# Patient Record
Sex: FEMALE | Race: WHITE | NOT HISPANIC OR LATINO | Employment: FULL TIME | URBAN - METROPOLITAN AREA
[De-identification: names, ages, dates, MRNs, and addresses within clinical notes are randomized per-mention and may not be internally consistent; named-entity substitution may affect disease eponyms.]

---

## 2023-06-23 LAB
ABO GROUP (TYPE) IN BLOOD: NORMAL
ANTIBODY SCREEN: NORMAL
CHOLESTEROL (MG/DL) IN SER/PLAS: 170 MG/DL (ref 0–199)
CHOLESTEROL IN HDL (MG/DL) IN SER/PLAS: 59.5 MG/DL
CHOLESTEROL/HDL RATIO: 2.9
HEPATITIS B VIRUS SURFACE AG PRESENCE IN SERUM: NONREACTIVE
HEPATITIS C VIRUS AB PRESENCE IN SERUM: NONREACTIVE
HIV 1/ 2 AG/AB SCREEN: NONREACTIVE
LDL: 97 MG/DL (ref 0–99)
RH FACTOR: NORMAL
RUBELLA VIRUS IGG AB: POSITIVE
SYPHILIS TOTAL AB: NONREACTIVE
THYROTROPIN (MIU/L) IN SER/PLAS BY DETECTION LIMIT <= 0.05 MIU/L: 2.18 MIU/L (ref 0.44–3.98)
TRIGLYCERIDE (MG/DL) IN SER/PLAS: 70 MG/DL (ref 0–149)
VARICELLA ZOSTER IGG: POSITIVE
VLDL: 14 MG/DL (ref 0–40)

## 2023-06-24 LAB
CHLAMYDIA TRACH., AMPLIFIED: NEGATIVE
N. GONORRHEA, AMPLIFIED: NEGATIVE

## 2023-06-27 LAB — ANTI-MULLERIAN HORMONE (AMH): 1.55 NG/ML (ref 0.18–11.71)

## 2023-09-25 LAB
ESTIMATED AVERAGE GLUCOSE FOR HBA1C: 108 MG/DL
ESTRADIOL (PG/ML) IN SER/PLAS: 258 PG/ML
HEMOGLOBIN A1C/HEMOGLOBIN TOTAL IN BLOOD: 5.4 %
LUTEINIZING HORMONE (IU/ML) IN SER/PLAS: 18.4 IU/L

## 2023-10-11 ENCOUNTER — TELEPHONE (OUTPATIENT)
Dept: ENDOCRINOLOGY | Facility: CLINIC | Age: 38
End: 2023-10-11
Payer: COMMERCIAL

## 2023-10-11 NOTE — TELEPHONE ENCOUNTER
Patient got a negative pregnancy test, wants to discuss next steps      RN returned patient's phone call. Patient had a negative pregnancy test post IUI. Patient also reported she has abdominal bloating and pain in her right ovary. RN informed patient this can be a normal occurrence post trigger and should resolve once her menses begins. If not, and she is still experiencing pain she can call the office back. RN instructed patient to stop her prometrium and to call the office with her menses to start her next Letrozole 5mg, monitoring, trigger, IUI cycle. Patient verbalized understanding.   Liseth García RN 10/11/23 12:52 PM

## 2023-10-12 DIAGNOSIS — N97.9 FEMALE INFERTILITY: ICD-10-CM

## 2023-10-13 ENCOUNTER — TELEPHONE (OUTPATIENT)
Dept: ENDOCRINOLOGY | Facility: CLINIC | Age: 38
End: 2023-10-13
Payer: COMMERCIAL

## 2023-10-13 DIAGNOSIS — N97.9 FEMALE INFERTILITY: ICD-10-CM

## 2023-10-13 RX ORDER — LETROZOLE 2.5 MG/1
5 TABLET, FILM COATED ORAL DAILY
Qty: 10 TABLET | Refills: 0 | Status: SHIPPED | OUTPATIENT
Start: 2023-10-13 | End: 2023-10-18

## 2023-10-13 NOTE — TELEPHONE ENCOUNTER
Returned patient call regarding CD1 today 10/13/23. Per conversation with Dr. Musa and Katie Castaneda, RN, patient is to come in to baseline this cycle d/t Right ovary pain after trigger shot last cycle. Patient is concerned she may have a cyst and is unsure if she should proceed with Letrozole/trigger shot this cycle. Dr. Musa recommends that patient come in for baseline this cycle to see what is happening and go from there. Patient states she will be OOT 10/20-10/25 and is unsure if she would like to proceed this cycle. Patient states she would like to discuss with partner and go from there. Patient instructed to call office and schedule a baseline for CD4 10/16/23 if she would like to proceed and we can go from there after we review.   ALYSSA HANNON on 10/13/23 at 9:39 AM.   Patient called office to schedule baseline for 10/16/23 for follicle scan, E2 and LH for possible Letrozole 5 mg/monitoring/trigger/IUI cycle. Patient instructed she can take Letrozole CD4-8 after a negative home pregnancy test. Patient instructed Letrozole prescription sent to local pharmacy of choice.   ALYSSA HANNON on 10/13/23 at 9:41 AM.

## 2023-10-16 ENCOUNTER — APPOINTMENT (OUTPATIENT)
Dept: ENDOCRINOLOGY | Facility: CLINIC | Age: 38
End: 2023-10-16
Payer: COMMERCIAL

## 2023-10-23 ENCOUNTER — TELEPHONE (OUTPATIENT)
Dept: ENDOCRINOLOGY | Facility: CLINIC | Age: 38
End: 2023-10-23
Payer: COMMERCIAL

## 2023-10-23 NOTE — TELEPHONE ENCOUNTER
Eboni needs clarification on the start date for the patient. Received a request with a start date of 9/26 and of 10/13

## 2023-10-23 NOTE — TELEPHONE ENCOUNTER
This RN sent email to Adrianna Hutchison regarding Progeny questions.   ALYSSA HANNON on 10/23/23 at 3:23 PM.

## 2024-01-08 ENCOUNTER — TELEPHONE (OUTPATIENT)
Dept: NEUROLOGY | Facility: CLINIC | Age: 39
End: 2024-01-08
Payer: COMMERCIAL

## 2024-01-08 DIAGNOSIS — G43.909 MIGRAINE WITHOUT STATUS MIGRAINOSUS, NOT INTRACTABLE, UNSPECIFIED MIGRAINE TYPE: ICD-10-CM

## 2024-01-08 RX ORDER — METHYLPREDNISOLONE 4 MG/1
TABLET ORAL
Qty: 21 TABLET | Refills: 0 | Status: SHIPPED | OUTPATIENT
Start: 2024-01-08 | End: 2024-01-15

## 2024-01-08 NOTE — TELEPHONE ENCOUNTER
Spoke with Sujata. Is on day 3 of Medrol dose staci which worked at 2 days of higher doses but severe migraine has returned.  Restart medrol course with 6 tabs today from previous staci and sending another for her to continue 6 days taper. Has appointment scheduled for February for follow up.

## 2024-02-08 ENCOUNTER — TELEPHONE (OUTPATIENT)
Dept: ENDOCRINOLOGY | Facility: CLINIC | Age: 39
End: 2024-02-08
Payer: COMMERCIAL

## 2024-02-08 NOTE — PROGRESS NOTES
Boarding Pass Oral/Injectible with TIC/IUI Checklist    Age: 38 y.o.    Provider: MD Radha Mariscal, CNP  Primary RN: Ledy   Reasons for Treatment:  hx of dysmenorrhea and cysts  Last BMI  23 : 20.60 kg/m²       Past Medical History:   Diagnosis Date    Female infertility, unspecified 2020    Female infertility, unexplained    Lyme disease, unspecified 2017    Lyme disease, acute    Other ovarian cyst, right side 09/15/2020    Complex cyst of both ovaries    Other specified joint disorders, left hip 03/15/2016    Femoral acetabular impingement, left    Personal history of other diseases of the female genital tract 2020    History of irregular menstrual cycles    Personal history of other diseases of the female genital tract 2015    Personal history of endometriosis    Personal history of other diseases of the female genital tract 2020    History of dysmenorrhea    Personal history of other diseases of the nervous system and sense organs     History of migraine    Personal history of other specified conditions 2017    History of nausea    Personal history of other specified conditions 10/10/2019    History of syncope    Personal history of other specified conditions 10/07/2019    History of syncope    Personal history of traumatic brain injury     History of concussion    Postural orthostatic tachycardia syndrome (POTS)     POTS (postural orthostatic tachycardia syndrome)       Date Done Consultation Results/Comments   2023 Medication Protocol LETROZOLE 5mg CD 3-7 trigger, IUI and prog x 3 cycles    10/12/2023 Procedure Order Placed [x]      MFM Consult Okay to proceed? N/A    Psych Consult Okay to proceed? N/A    Genetics Consult Okay to proceed? N/A    Other    Date Done Female Labs Results/Comments   2023 T&S (Q 1 Year) Results for orders placed or performed in visit on 23 (from the past 8760 hour(s))   Type And Screen   Result Value Ref  Range    ABO GROUP (TYPE) IN BLOOD A     Rh POS     ANTIBODY SCREEN NEG         6/23/2023 Hep B sAg NONREACTIVE   6/23/2023 Hep C AB NONREACTIVE   6/23/2023 HIV NONREACTIVE   6/23/2023 Syphilis NONREACTIVE   6/23/2023 GC/CT GC: NEGATIVE  CT: NEGATIVE   6/23/2023 Rubella (Q 5 Years) POSITIVE   6/23/2023 Varicella (Q 5 Years) POSITIVE   6/23/2023 TSH 2.18 (Ref range: 0.44 - 3.98 mIU/L)   9/25/2023 HgbA1C 5.4 (Ref range: %)   6/23/2023 AMH 1.553    Carrier Screening Myriad 2bP:   Declined  N/A    Uterine Cavity Eval Pelvic US: (06/23/2023) Anteverted uterus with an endometrial lining that measures as below.   The ovaries are normal in  appearance bilaterally.    HSG: (recommended if not pregnant after 3 IUI cycles)       1/13/2020 Pap Smear Lab Results   Component Value Date    CVTFINALDX  01/13/2020     A.  THINPREP CERVICAL:              Specimen adequacy:       SATISFACTORY FOR EVALUATION.       Quality Indicator: Endocervical/transformation zone component is present.       Quality Indicator: Partially obscuring inflammation.              General Categorization:       NEGATIVE FOR INTRAEPITHELIAL LESION OR MALIGNANCY.                            HIGH RISK HPV TEST RESULT:                       HPV GENOTYPE  16                      NEGATIVE       HPV GENOTYPE  18                      NEGATIVE       HPV GENOTYPE  OTHER              NEGATIVE              Reference Range: Negative                      2/9/24 Mammogram WNL   Date Done Male Labs (required if IUI)   Results/Comments  KRISTY DE LOS SANTOS (MRN: [id number])   8/8/2023 Hep B sAg NONREACTIVE   8/8/2023 Hep C AB  NONREACTIVE   8/8/2023 HIV NONREACTIVE   8/8/2023 Syphilis NONREACTIVE   8/8/2023 GC/CT GC: NEGATIVE  CT: NEGATIVE    Carrier Screening   Declined  N/A   7/20/2023 Semen Analysis  Volume(mL): 3  Count(million): 142.86  Motility(%): 52%  Motile Count(million): 223.93   Date Done Miscellaneous Results/Comments    BMI Checklist  BMI > 40 or < 18 Added to chart:        >= 45 Checklist  Added to chart:      Needs mammogram prior to next Letrozole/IUI cycle.   24 at 12:40 PM - BRIDGET Jones-CNP     Boarding pass reviewed and agree with plan Letrozole 5 mg Cd 3-7 with ultrasound monitoring hcg trigger and partner IUI with LP prometrium ok for 2 additional cycles if not pregnant recommend HSG and FUV. Cycles must be done before labs  2024  Reviewed and approved by STEPHANE MENJIVAR on 3/5/24 at 3:55 PM.

## 2024-02-08 NOTE — TELEPHONE ENCOUNTER
Returned phone call to patient. Explained that we unfortunately cannot move forward with letrozole or fertility treatment until her mammogram has been obtained and results reviewed. (Confirmed with both NP and Doctor of the day) Apologized that communication was not made prior as we have not had recent communication with the patient that would have prompted this information to be shared proactively.     Encouraged patient that all other testing/blood work for her and her partner are up to date and once we have her mammogram we can certainly move forward with two more IUI cycles prior to needing follow-up with a provider. Relayed to patient that I will gain recommendations on where patient should try to schedule her mammogram in order to move forward with IUI cycles.     Patient verbalized understanding, all questions answered at this time.   Will contact patient with recommendations from our providers on where to seek scheduling her mammogram.     MCKENNA TRISTAN RN 02/08/2024 14:13

## 2024-02-09 ENCOUNTER — TELEPHONE (OUTPATIENT)
Dept: PRIMARY CARE | Facility: CLINIC | Age: 39
End: 2024-02-09
Payer: COMMERCIAL

## 2024-02-09 ENCOUNTER — HOSPITAL ENCOUNTER (OUTPATIENT)
Dept: RADIOLOGY | Facility: CLINIC | Age: 39
Discharge: HOME | End: 2024-02-09
Payer: COMMERCIAL

## 2024-02-09 VITALS — BODY MASS INDEX: 20.47 KG/M2 | WEIGHT: 119.93 LBS | HEIGHT: 64 IN

## 2024-02-09 DIAGNOSIS — Z12.31 VISIT FOR SCREENING MAMMOGRAM: Primary | ICD-10-CM

## 2024-02-09 DIAGNOSIS — Z12.31 SCREENING MAMMOGRAM FOR BREAST CANCER: Primary | ICD-10-CM

## 2024-02-09 DIAGNOSIS — Z12.31 SCREENING MAMMOGRAM FOR BREAST CANCER: ICD-10-CM

## 2024-02-09 DIAGNOSIS — Z12.31 ENCOUNTER FOR SCREENING MAMMOGRAM FOR MALIGNANT NEOPLASM OF BREAST: ICD-10-CM

## 2024-02-09 PROCEDURE — 77067 SCR MAMMO BI INCL CAD: CPT

## 2024-02-09 PROCEDURE — 77067 SCR MAMMO BI INCL CAD: CPT | Performed by: RADIOLOGY

## 2024-02-09 PROCEDURE — 77063 BREAST TOMOSYNTHESIS BI: CPT | Performed by: RADIOLOGY

## 2024-02-09 NOTE — TELEPHONE ENCOUNTER
Charlette from [facility]ertility called on behalf of the patient stating she needed a mammogram referral expedited. Thank you fax [phone]

## 2024-02-10 PROBLEM — Z12.31 VISIT FOR SCREENING MAMMOGRAM: Status: ACTIVE | Noted: 2024-02-10

## 2024-02-10 NOTE — TELEPHONE ENCOUNTER
Screening mammogram ordered.  But I see that the patient already had mammogram done on February 9, 2024.

## 2024-02-11 PROCEDURE — 87635 SARS-COV-2 COVID-19 AMP PRB: CPT

## 2024-02-12 ENCOUNTER — LAB REQUISITION (OUTPATIENT)
Dept: LAB | Facility: HOSPITAL | Age: 39
End: 2024-02-12
Payer: COMMERCIAL

## 2024-02-12 DIAGNOSIS — R07.0 PAIN IN THROAT: ICD-10-CM

## 2024-02-12 DIAGNOSIS — Z20.89 CONTACT WITH AND (SUSPECTED) EXPOSURE TO OTHER COMMUNICABLE DISEASES: ICD-10-CM

## 2024-02-12 LAB — SARS-COV-2 RNA RESP QL NAA+PROBE: NOT DETECTED

## 2024-02-16 ENCOUNTER — TELEPHONE (OUTPATIENT)
Dept: PRIMARY CARE | Facility: CLINIC | Age: 39
End: 2024-02-16

## 2024-02-16 NOTE — TELEPHONE ENCOUNTER
Pt states she has been sick for three weeks. Sx consist of sore throat, earache, sinus pain and post nasal drip. She said that she went to urgent care and had a rapid strep test done but believes she should have a longer strep test. She is asking if she can come in for an appointment as she was already seen virtually for this to no avail. She asks if you cannot see her in office, if you could give your recommendation to who she can see. Please advise.

## 2024-02-19 NOTE — TELEPHONE ENCOUNTER
Called patient to get them schedule concerning been sick patient states that she is now better and doesn't need appointment. Thank you

## 2024-02-22 ENCOUNTER — OFFICE VISIT (OUTPATIENT)
Dept: NEUROLOGY | Facility: CLINIC | Age: 39
End: 2024-02-22
Payer: COMMERCIAL

## 2024-02-22 VITALS
WEIGHT: 131 LBS | HEART RATE: 76 BPM | BODY MASS INDEX: 22.36 KG/M2 | TEMPERATURE: 98.4 F | SYSTOLIC BLOOD PRESSURE: 96 MMHG | DIASTOLIC BLOOD PRESSURE: 64 MMHG | RESPIRATION RATE: 16 BRPM | HEIGHT: 64 IN

## 2024-02-22 DIAGNOSIS — G43.909 MIGRAINE WITHOUT STATUS MIGRAINOSUS, NOT INTRACTABLE, UNSPECIFIED MIGRAINE TYPE: ICD-10-CM

## 2024-02-22 PROCEDURE — 99215 OFFICE O/P EST HI 40 MIN: CPT | Performed by: PSYCHIATRY & NEUROLOGY

## 2024-02-22 PROCEDURE — 1036F TOBACCO NON-USER: CPT | Performed by: PSYCHIATRY & NEUROLOGY

## 2024-02-22 RX ORDER — PROGESTERONE 100 MG/1
CAPSULE ORAL
COMMUNITY
Start: 2023-09-25

## 2024-02-22 RX ORDER — VALACYCLOVIR HYDROCHLORIDE 500 MG/1
TABLET, FILM COATED ORAL
COMMUNITY
Start: 2017-07-18

## 2024-02-22 RX ORDER — TIZANIDINE HYDROCHLORIDE 2 MG/1
CAPSULE, GELATIN COATED ORAL
COMMUNITY
Start: 2017-04-03

## 2024-02-22 RX ORDER — LETROZOLE 2.5 MG/1
TABLET, FILM COATED ORAL
COMMUNITY
Start: 2020-10-08

## 2024-02-22 RX ORDER — PRENATAL VIT 49/IRON FUM/FOLIC 6.75-0.2MG
TABLET ORAL
COMMUNITY

## 2024-02-22 RX ORDER — SODIUM CHLORIDE 1000 MG
1 TABLET, SOLUBLE MISCELLANEOUS 3 TIMES DAILY
COMMUNITY
Start: 2019-11-27

## 2024-02-22 RX ORDER — METHYLPREDNISOLONE 4 MG/1
TABLET ORAL
Qty: 21 TABLET | Refills: 0 | Status: SHIPPED | OUTPATIENT
Start: 2024-02-22 | End: 2024-03-04

## 2024-02-22 RX ORDER — UBROGEPANT 50 MG/1
TABLET ORAL
COMMUNITY
Start: 2020-03-16 | End: 2024-02-22 | Stop reason: ALTCHOICE

## 2024-02-22 ASSESSMENT — PATIENT HEALTH QUESTIONNAIRE - PHQ9
SUM OF ALL RESPONSES TO PHQ9 QUESTIONS 1 AND 2: 0
1. LITTLE INTEREST OR PLEASURE IN DOING THINGS: NOT AT ALL
2. FEELING DOWN, DEPRESSED OR HOPELESS: NOT AT ALL

## 2024-02-22 NOTE — PROGRESS NOTES
"Last visit for migraines 7-2022.   Experiencing 5 migraines per month.   Treats with Ubrelvy 50mg. 50% of time resolves. 50% of time repeats. Resolves 80% of time in 4-6 hours. Unsure about co-pay card and patient assistance which she had been using. Large deductible plan     Migraine occurs one side of head-either, above eye or occipital. A lot of pain where\"skull sits on my neck\"  Associated light and noise sensitivity, nausea.   Triggers are menstrual cycle, certain foods, stress    Currently not taking prevention.   Had been on Tizanidine 2mg. Concerned about reading of use being precursor for Alzheimer's.   Uses very infrequently and does help sleep with severe migraine    Sleeps with heating pad to neck. Averages 7 hours.  Denies anxiety or depression.     Needed double medrol dose staci to resolve an intractable migraine in January 2024. It broke it successfully.     Still trying for pregnancy with infertility treatments. Next round in March 2024    Prevention trials  Amitriptyline- AM sluggishness-could not stay awake  Topamax -side effect  Nortriptyline    Treatment trials  Sumatriptan side effect throat closing  Almotriptan  Relpax  Rizatriptan  Zolmitriptan    "

## 2024-02-22 NOTE — PATIENT INSTRUCTIONS
Stop ubrelvy the night before IUI  Medrol dose pack for breaking headache cycle do don't take after fertilization  
90

## 2024-02-26 ENCOUNTER — SPECIALTY PHARMACY (OUTPATIENT)
Dept: PHARMACY | Facility: CLINIC | Age: 39
End: 2024-02-26

## 2024-02-27 ENCOUNTER — PHARMACY VISIT (OUTPATIENT)
Dept: PHARMACY | Facility: CLINIC | Age: 39
End: 2024-02-27
Payer: COMMERCIAL

## 2024-02-27 PROCEDURE — RXMED WILLOW AMBULATORY MEDICATION CHARGE

## 2024-03-05 ENCOUNTER — TELEPHONE (OUTPATIENT)
Dept: ENDOCRINOLOGY | Facility: CLINIC | Age: 39
End: 2024-03-05
Payer: COMMERCIAL

## 2024-03-05 DIAGNOSIS — Z31.89 ENCOUNTER FOR ARTIFICIAL INSEMINATION: ICD-10-CM

## 2024-03-05 DIAGNOSIS — N97.9 FEMALE INFERTILITY: ICD-10-CM

## 2024-03-05 RX ORDER — PROGESTERONE 100 MG/1
100 CAPSULE ORAL 2 TIMES DAILY
Qty: 60 CAPSULE | Refills: 3 | Status: SHIPPED | OUTPATIENT
Start: 2024-03-05 | End: 2025-03-05

## 2024-03-05 RX ORDER — LETROZOLE 2.5 MG/1
5 TABLET, FILM COATED ORAL DAILY
Qty: 10 TABLET | Refills: 0 | Status: SHIPPED | OUTPATIENT
Start: 2024-03-05 | End: 2024-04-03 | Stop reason: SDUPTHER

## 2024-03-05 NOTE — TELEPHONE ENCOUNTER
Patient called with menses for IUI cycle  Cycle #:  IUI #2  Medication: Letrozole 5 mg   Ovulation: Trigger Pregnyl   Sperm Source:  partner fresh  Luteal Support: Prometrium 100 mg BID PV   Additional Medications:  N/A  Boarding Pass signed off: Boarding pass reviewed and agree with plan Letrozole 5 mg Cd 3-7 with ultrasound monitoring hcg trigger and partner IUI with LP prometrium ok for 2 additional cycles if not pregnant recommend HSG and FUV. Cycles must be done before labs  2024  Reviewed and approved by STEPHANE MENJIVAR on 3/5/24 at 3:55 PM.   IUI order pended: Yes   Additional Information: spoke with patient regarding above plan. Patient instructed Letrozole 5 mg was sent to local pharmacy and she can take CD 3-7 after a negative home pregnancy test. Patient instructed trigger shot was sent to [facility] and was given [facility] contact information. Patient instructed to RTC on 3/15/24 CD 11 for monitoring. Patient instructed Prometrium was sent to local pharmacy and she will start this rx 3 days after IUI. Patient instructed to call office tomorrow to schedule monitoring. Patient inquiring about self pay packages. Patient instructed this RN sent message to financial team to contact patient regarding self pay. Patient agreeable. Patient denies further questions or concerns.   ALYSSA HANNON on 3/5/24 at 4:32 PM.

## 2024-03-15 ENCOUNTER — SPECIALTY PHARMACY (OUTPATIENT)
Dept: PHARMACY | Facility: CLINIC | Age: 39
End: 2024-03-15

## 2024-03-15 ENCOUNTER — PHARMACY VISIT (OUTPATIENT)
Dept: PHARMACY | Facility: CLINIC | Age: 39
End: 2024-03-15
Payer: COMMERCIAL

## 2024-03-15 ENCOUNTER — ANCILLARY PROCEDURE (OUTPATIENT)
Dept: ENDOCRINOLOGY | Facility: CLINIC | Age: 39
End: 2024-03-15
Payer: COMMERCIAL

## 2024-03-15 DIAGNOSIS — N97.9 FEMALE INFERTILITY: ICD-10-CM

## 2024-03-15 LAB
ESTRADIOL SERPL-MCNC: 230 PG/ML
LH SERPL-ACNC: 9.2 IU/L

## 2024-03-15 PROCEDURE — 36415 COLL VENOUS BLD VENIPUNCTURE: CPT

## 2024-03-15 PROCEDURE — 82670 ASSAY OF TOTAL ESTRADIOL: CPT

## 2024-03-15 PROCEDURE — 76857 US EXAM PELVIC LIMITED: CPT | Performed by: OBSTETRICS & GYNECOLOGY

## 2024-03-15 PROCEDURE — RXMED WILLOW AMBULATORY MEDICATION CHARGE

## 2024-03-15 PROCEDURE — 76857 US EXAM PELVIC LIMITED: CPT

## 2024-03-15 PROCEDURE — 83002 ASSAY OF GONADOTROPIN (LH): CPT

## 2024-03-15 RX ORDER — CHORIONIC GONADOTROPIN 10000 UNIT
10000 KIT INTRAMUSCULAR ONCE AS NEEDED
Qty: 1 EACH | Refills: 0 | Status: SHIPPED
Start: 2024-03-15 | End: 2024-04-03 | Stop reason: SDUPTHER

## 2024-03-15 NOTE — PROGRESS NOTES
CYCLING NOTE    Here for US and/or lab monitoring; relevant findings reviewed.  40yo patient of Dr. Musa/Radha Rossi CNP. IUI #2. Letrozole 5mg. Trigger: Pregnyl. LP Prometrium 100mg PV BID.   Patient stayed for nurse visit. Pain is 0/10. Patient states she does not yet have trigger shot, Britney and Charlette phone numbers provided to patient. Trigger shot confirmed to be ordered to [facility], 1 of 1 refills remaining.   Team will contact patient later today with results and plan.    Janine Good  03/15/2024  8:52 AM    HUDDLE PROVIDER NOTE - TRIGGER  Ultrasound and/or labs reviewed at huddle. Patient ready for trigger.   Results for orders placed or performed in visit on 03/15/24 (from the past 96 hour(s))   Luteinizing Hormone (LH)   Result Value Ref Range    Luteinizing Hormone 9.2 IU/L   Estradiol   Result Value Ref Range    Estradiol 230 pg/mL     Sat 3/16 (Day 12)   chorionic gonadotropin injection: Inject 10,000 Units 1 time if needed under the skin      HCG trigger tomorrow between 8-10pm for intrauterine insemination on Monday    Marce Graham  03/15/2024  1:10 PM    Telephone call made to patient to discuss MD plan above. Detailed MyChart sent to patient with plan. Patient agreeable to plan. Patient states she was able to  trigger shot earlier this afternoon. All questions answered. Patient transferred to  to schedule for IUI Monday 3/18.    03/15/24 at 2:48 PM - Janine Good RN

## 2024-03-18 ENCOUNTER — PROCEDURE VISIT (OUTPATIENT)
Dept: ENDOCRINOLOGY | Facility: CLINIC | Age: 39
End: 2024-03-18
Payer: COMMERCIAL

## 2024-03-18 DIAGNOSIS — Z31.89 ENCOUNTER FOR ARTIFICIAL INSEMINATION: ICD-10-CM

## 2024-03-18 PROCEDURE — 58322 ARTIFICIAL INSEMINATION: CPT | Performed by: NURSE PRACTITIONER

## 2024-03-18 PROCEDURE — 89261 SPERM ISOLATION COMPLEX: CPT | Performed by: OBSTETRICS & GYNECOLOGY

## 2024-03-18 NOTE — PROGRESS NOTES
"Patient ID: Sujata Falcon is a 39 y.o. female.    Intrauterine Insemination (IUI)    Date/Time: 3/18/2024 9:59 AM    Performed by: RADHA Ortiz  Authorized by: RADHA Galicia    Consent:     Consent obtained:  Verbal and written    Consent given by:  Patient    Procedure risks and benefits discussed: yes      Patient questions answered: yes      Patient agrees, verbalizes understanding, and wants to proceed: yes      Educational handouts given: yes      Instructions and paperwork completed: yes    Procedure:     Pelvic exam performed: yes      Speculum placed in vagina: yes      Tenaculum applied to cervix: no      Type of insemination: intrauterine insemination      Ultrasound guidance: No      Speculum type: Rufina      Catheter type: curved      Curvature: mild      Difficulty: easy      Estimated Blood Loss:  None    Specimen Return: none    Post-procedure:     Patient tolerated procedure well: yes      Post-procedure plan: patient counseled on signs and symptoms for which to call and/or return to clinic    Comments:     Procedure comments:  IUI Procedure note:    The patient presented today for IUI.     Consent signed by patient, IUI sample identified by patient, and Final Verification performed with patient via electronic system \"\" prior to insemination.   All patient's questions were discussed and answered.          Chaperone offered to patient for intimate exam: Patient declined chaperone  Name of chaperone: N/A    Specimen Source: Partner  Specimen Condition: Fresh  TMS 10.11 mil    Additional notes:    Patient was advised to call office if she develops fever, chills, pelvic pain, or heavy bleeding.    Progesterone and post-IUI teaching completed. Will start Progesterone three days after IUI and continue twice daily. Pt will do a home pregnancy test two weeks from IUI date. If home pregnancy test positive, patient will continue Progesterone and [facility]. " If home pregnancy test negative, patient will discontinue Progesterone, and was advised to call office with start of menses; will proceed with another cycle if appropriate.  Patient verbalized understanding of plan.      Paloma Mckeon 03/18/24 9:59 AM

## 2024-03-21 ENCOUNTER — SPECIALTY PHARMACY (OUTPATIENT)
Dept: PHARMACY | Facility: CLINIC | Age: 39
End: 2024-03-21

## 2024-03-21 PROCEDURE — RXMED WILLOW AMBULATORY MEDICATION CHARGE

## 2024-03-22 ENCOUNTER — PHARMACY VISIT (OUTPATIENT)
Dept: PHARMACY | Facility: CLINIC | Age: 39
End: 2024-03-22
Payer: COMMERCIAL

## 2024-04-03 ENCOUNTER — TELEPHONE (OUTPATIENT)
Dept: ENDOCRINOLOGY | Facility: CLINIC | Age: 39
End: 2024-04-03
Payer: COMMERCIAL

## 2024-04-03 DIAGNOSIS — N97.9 FEMALE INFERTILITY: ICD-10-CM

## 2024-04-03 PROCEDURE — RXMED WILLOW AMBULATORY MEDICATION CHARGE

## 2024-04-03 RX ORDER — CHORIONIC GONADOTROPIN 10000 UNIT
10000 KIT INTRAMUSCULAR ONCE AS NEEDED
Qty: 1 EACH | Refills: 0 | Status: SHIPPED | OUTPATIENT
Start: 2024-04-03

## 2024-04-03 RX ORDER — LETROZOLE 2.5 MG/1
5 TABLET, FILM COATED ORAL DAILY
Qty: 10 TABLET | Refills: 0 | Status: SHIPPED | OUTPATIENT
Start: 2024-04-03 | End: 2024-07-02

## 2024-04-03 NOTE — TELEPHONE ENCOUNTER
"Patient called with menses for IUI   cycle Cycle #:  3  Medication: Letrozole 5mg  Ovulation: Trigger Hcg  Sperm Source:  partner fresh   Luteal Support: Prometrium   Additional Medications:  N/A    Boarding Pass signed off: yes Reviewed and approved by STEPHANE MENJIVAR on 3/5/24 at 3:55 PM   *this is second and last approved cycle* (plan Letrozole 5 mg Cd 3-7 with ultrasound monitoring hcg trigger and partner IUI with LP prometrium ok for 2 additional cycles if not pregnant recommend HSG and FUV )  IUI order pended: yes  Additional Information: N/A    Telephone call made to patient to discuss plan/next steps. This RN discussed with patient that this will be her final approved Letrozole/IUI cycle and that a follow-up visit with a provider is required. Patient agreeable. This RN discussed with patient that her Letrozole/trigger shot prescriptions were pended to a provider, and preferred pharmacy clarified. This RN discussed with patient that she is to take Letrozole 5mg CD 3-7 after a negative home pregnancy test. This RN discussed with patient that per previous cycles, we are going to bring her in on CD 10 which is next Friday 4/12 for monitoring (as with her previous cycle she was ready for trigger on CD 11). This RN discussed what patients previous cycle looked like i.e. follicle size, LH, and E2 results. Patient then expresses frustration with feeling \"in the dark\" of her results from previous IUI cycles. RN was able to find previous monitoring results (follicle size, lining size, LH, and E2 from two previous cycles) and RN relayed this information in detail to patient. RN expressed to patient that the cycles prior to those were prior to switching to Epic and that is why she may not be able to see results on her end. Patient continues to state that she feels in the dark with this process and that she doesn't have answers to her questions. RN discussed with patient that we follow protocols, we bring patients in for " monitoring based off of their cycle, we trigger based off of monitoring results, and that IUI always falls 36hrs after trigger. RN reminded patient that there is always an option to speak with a nurse after an appointment in order to have communication. RN discussed with patient that there is always a provider meeting, and that the providers relay the plan to the nurses, who then coordinate with the patient. Patient continues to state that she feels in the dark. RN discussed with patient that it would be beneficial to set up FUV with a provider to have these questions answered. Patient agreeable. Patient transferred to  to schedule FUV as soon as possible and monitoring appointment for 4/12. Leadership made aware of patients concerns.    04/03/24 at 10:21 AM - Janine Good RN

## 2024-04-04 ENCOUNTER — TELEMEDICINE (OUTPATIENT)
Dept: ENDOCRINOLOGY | Facility: CLINIC | Age: 39
End: 2024-04-04
Payer: COMMERCIAL

## 2024-04-04 DIAGNOSIS — Z31.41 FERTILITY TESTING: Primary | ICD-10-CM

## 2024-04-04 DIAGNOSIS — N97.0 SECONDARY ANOVULATORY INFERTILITY: ICD-10-CM

## 2024-04-04 PROCEDURE — 1036F TOBACCO NON-USER: CPT | Performed by: NURSE PRACTITIONER

## 2024-04-04 PROCEDURE — 99214 OFFICE O/P EST MOD 30 MIN: CPT | Performed by: NURSE PRACTITIONER

## 2024-04-04 NOTE — PROGRESS NOTES
Virtual or Telephone Consent: An interactive audio and video telecommunication system which permits real time communications between the patient (at the originating site) and provider (at the distant site) was utilized to provide this telehealth service    Follow Up Visit HPI    Patient is a 39 y.o.  female with Unexplained Infertility presenting today for follow up visit. She was wanting to touch base prior to her IUI.    Reviewed all of her previous IUI cycles including those in . Reviewed follicles, endometrial lining, trigger dates and labs.     Testing to date:   Result Date Done   TSH: 2.18 (Ref range: 0.44 - 3.98 mIU/L) 2023   AMH: 1.553 (Ref range: 0.176 - 11.705 ng/mL) 2023   PRL: No results found for requested labs within last 365 days. No results found for requested labs within last 365 days.   Testosterone: No results found for requested labs within last 365 days. No results found for requested labs within last 365 days.   DHEAS: No results found for requested labs within last 365 days. No results found for requested labs within last 365 days.   Other: n/a    Hysterosalpingogram: previously done in .  Saline Infused Sonography: n/a  GYN Pelvic Ultrasound: US PELVIS TRANSVAGINAL (2023):  normal    Partner SA: Normal    Treatment to date:   Fertility Cycles       Cycle Name Treatment Start Date Type Outcome    IUI #3 (2nd/final since BP approved 3/5)   Active    IUI #2 2024 IUI No Pregnancy            Past Medical History:   Diagnosis Date    Female infertility, unspecified 2020    Female infertility, unexplained    Lyme disease, unspecified 2017    Lyme disease, acute    Other ovarian cyst, right side 09/15/2020    Complex cyst of both ovaries    Other specified joint disorders, left hip 03/15/2016    Femoral acetabular impingement, left    Personal history of other diseases of the female genital tract 2020    History of irregular menstrual cycles     Personal history of other diseases of the female genital tract 01/19/2015    Personal history of endometriosis    Personal history of other diseases of the female genital tract 06/06/2020    History of dysmenorrhea    Personal history of other diseases of the nervous system and sense organs     History of migraine    Personal history of other specified conditions 02/20/2017    History of nausea    Personal history of other specified conditions 10/10/2019    History of syncope    Personal history of other specified conditions 10/07/2019    History of syncope    Personal history of traumatic brain injury     History of concussion    Postural orthostatic tachycardia syndrome (POTS)     POTS (postural orthostatic tachycardia syndrome)     Past Surgical History:   Procedure Laterality Date    OTHER SURGICAL HISTORY  06/24/2014    Surgery     Current Outpatient Medications on File Prior to Visit   Medication Sig Dispense Refill    choriogonadotropin christi (Ovidrel) 250 mcg/0.5 mL injection INJECT 250 MCG (1 SYRINGE) UNDER THE SKIN AS A ONE TIME DOSE, AS DIRECTED PER PROVIDER FOR TRIGGER. 1 mL 2    chorionic gonadotropin (Pregnyl) 10,000 unit injection RECONSTITUTE ACCORDING TO INSTRUCTIONS AND INJECT 10,000 UNITS (1 ML) UNDER THE SKIN AS A ONE TIME DOSE, AS DIRECTED PER PROVIDER FOR TRIGGER. 1 each 1    chorionic gonadotropin (Pregnyl) 10,000 unit injection Reconstitute according to instructions and inject 10,000 units (1 mL) under the skin as a one time dose, as directed per provider for trigger. 1 each 0    letrozole (Femara) 2.5 mg tablet Take by mouth once daily.      letrozole (Femara) 2.5 mg tablet Take 2 tablets (5 mg total) by mouth once daily.  Take 2 tablets by mouth cycle days 3-7 after negative urine pregnancy test every cycle before starting letrozole. 10 tablet 0    prenatal vit 49-iron fum-folic (Mini Prenatal) 6.75 mg iron- 200 mcg tablet Take by mouth.      progesterone (Prometrium) 100 mg capsule Take by  mouth.      progesterone (Prometrium) 100 mg capsule Insert 1 capsule (100 mg) into the vagina 2 times a day. Starting 3 days after IUI 60 capsule 3    sodium chloride 1,000 mg tablet Take 1 tablet (1 g) by mouth 3 times a day.      tiZANidine (Zanaflex) 2 mg capsule Take by mouth.      ubrogepant (Ubrelvy) 100 mg tablet tablet Take 1 tablet (100 mg) by mouth if needed (migraine). May repeat in 2 hours if not resolved. Maximum: 200mg per 24 hours. 16 tablet 6    valACYclovir (Valtrex) 500 mg tablet Take by mouth.      [DISCONTINUED] chorionic gonadotropin (Pregnyl) 10,000 unit injection Inject 10,000 Units under the skin 1 time if needed (N/A) for up to 1 dose. Reconstitute according to instructions and inject 10,000 units (1 mL) under the skin as a one time dose, as directed per provider for trigger. 1 each 0    [DISCONTINUED] letrozole (Femara) 2.5 mg tablet Take 2 tablets (5 mg total) by mouth once daily.  Take 2 tablets by mouth cycle days 3-7 after negative urine pregnancy test every cycle before starting letrozole. 10 tablet 0     No current facility-administered medications on file prior to visit.       BMI:   BMI Readings from Last 1 Encounters:   24 22.49 kg/m²     VITALS:  There were no vitals taken for this visit.  LMP: No LMP recorded.    ASSESSMENT   39 y.o.  female with secondary infertility x 1 years,  secondary infertilty  and the following pertinent medical issues: migraines with aura and POTS .    COUNSELING  Reviewed all her previous oral medication cycles and IUI even from . Reviewed pregnancy percentages with IUI.     We discussed the impact of age on fertility. We discussed that a woman is born with all of the follicles that she will have in her lifetime and that these numbers progressively decrease as the patient reaches menopause. We discussed that women can remain fertile into their late 30’s and even early 40’s, however, chance for success is significantly lower for women  who have infertility. We also discussed the higher rates of aneuploidy and miscarriage that occur as women age.    Encouraged IVF consult. Will update AMH.     Aware will need a hysteroscopy prior to IVF. Partner will also need a semen freeze.     Routine Testing  Fertility Center  STDs Within 1 year   Genetic carrier Waiver/Completed   T&S Within 1 year   AMH Within 1 year   TSH Within 1 year   Rubella/Varicella Within 5 years     BMI Testing [facility]  CBC Within 1 year   CMP Within 1 year   HgbA1c Within 1 year   Mag, Phos, Vit D <18 Within 1 year   MFM > 40  REQ   Wt loss consult > 40 OPT     PLAN  Orders Placed This Encounter   Procedures    Antimullerian Hormone (Amh)       FOLLOW UP   Consults:  IVF consult  Engaged MD  Take prenatal vitamins, vitamin D 2000 IUs daily  Discussed that treatment cannot proceed until checklist items are complete.   Additional testing for BMI < 18 or > 40: No.  Chart to primary nurse for care coordination and patient check list/education.    MD Completion:  Ectopic Risk: No  Medically Complex: No    Fertility Plan Update: Plan for letrozole 5mg with IUI x1 (currently set up next week for monitoring). If not pregnant plan to move on to IVF.    Paloma Mckeon  04/04/2024  2:01 PM

## 2024-04-12 ENCOUNTER — ANCILLARY PROCEDURE (OUTPATIENT)
Dept: ENDOCRINOLOGY | Facility: CLINIC | Age: 39
End: 2024-04-12
Payer: COMMERCIAL

## 2024-04-12 ENCOUNTER — SPECIALTY PHARMACY (OUTPATIENT)
Dept: PHARMACY | Facility: CLINIC | Age: 39
End: 2024-04-12

## 2024-04-12 ENCOUNTER — PHARMACY VISIT (OUTPATIENT)
Dept: PHARMACY | Facility: CLINIC | Age: 39
End: 2024-04-12
Payer: COMMERCIAL

## 2024-04-12 DIAGNOSIS — Z31.41 FERTILITY TESTING: ICD-10-CM

## 2024-04-12 DIAGNOSIS — N97.9 FEMALE INFERTILITY: ICD-10-CM

## 2024-04-12 LAB
ESTRADIOL SERPL-MCNC: 68 PG/ML
LH SERPL-ACNC: 7 IU/L

## 2024-04-12 PROCEDURE — 36415 COLL VENOUS BLD VENIPUNCTURE: CPT

## 2024-04-12 PROCEDURE — 76857 US EXAM PELVIC LIMITED: CPT | Performed by: OBSTETRICS & GYNECOLOGY

## 2024-04-12 PROCEDURE — 83002 ASSAY OF GONADOTROPIN (LH): CPT

## 2024-04-12 PROCEDURE — 76857 US EXAM PELVIC LIMITED: CPT

## 2024-04-12 PROCEDURE — 83516 IMMUNOASSAY NONANTIBODY: CPT

## 2024-04-12 PROCEDURE — 82670 ASSAY OF TOTAL ESTRADIOL: CPT

## 2024-04-12 NOTE — PROGRESS NOTES
CYCLING NOTE    Here for US and/or lab monitoring; relevant findings reviewed. Patient is 39 years old. Patient of Paloma Mckeon, CNP. AMH- 1.553 (had repeat level drawn today). Patient is here for monitoring for IUI #3 with Letrozole 5 mg. Patient is CD 10. Patient plans on triggering with HCG trigger. Patient does not yet have HCG trigger but was instructed to call today for ordering and . Patient had questions regarding fluid that was noted in lining. Patient also requesting a call from Luisana in the financial department. This RN sent message to [employer].     Patient stayed for nurse visit. Pain is 0/10  Team will contact patient later today with results and plan.    Alyssa Em  04/12/2024  7:56 AM      HUDDLE PROVIDER NOTE - TRIGGER  [facility]. [facility] trigger.   Results for orders placed or performed in visit on 04/12/24 (from the past 96 hour(s))   Estradiol   Result Value Ref Range    Estradiol 68 pg/mL   Luteinizing Hormone (LH)   Result Value Ref Range    Luteinizing Hormone 7.0 IU/L     Sat 4/13 (Day 11)   chorionic gonadotropin 10,000 unit injection: Inject 10,000 Units 1 time if needed under the skin      HCG trigger 4/13/24 between 8-10pm for intrauterine insemination on 4/15/24    Clinton Hopkins  04/12/2024  12:59 PM    Spoke with patient regarding above plan as well as sent my chart message with plan. Patient instructed fluid in lining is very minimal and not a cause for concern. Patient instructed to trigger tomorrow 4/13/24 for an IUI on Monday 4/15/24. Patient instructed to trigger between 8-10 PM. Patient agreeable. Patient denies further questions or concerns. Patient transferred to front to schedule appointment.   ALYSSA EM on 4/12/24 at 1:33 PM.

## 2024-04-15 ENCOUNTER — PROCEDURE VISIT (OUTPATIENT)
Dept: ENDOCRINOLOGY | Facility: CLINIC | Age: 39
End: 2024-04-15
Payer: COMMERCIAL

## 2024-04-15 DIAGNOSIS — N97.9 FEMALE INFERTILITY: ICD-10-CM

## 2024-04-15 PROCEDURE — 58322 ARTIFICIAL INSEMINATION: CPT | Performed by: NURSE PRACTITIONER

## 2024-04-15 PROCEDURE — 89261 SPERM ISOLATION COMPLEX: CPT | Performed by: OBSTETRICS & GYNECOLOGY

## 2024-04-15 NOTE — PROGRESS NOTES
"Patient ID: Sujata Falcon is a 39 y.o. female.    Intrauterine Insemination (IUI)    Date/Time: 4/15/2024 9:47 AM    Performed by: BRIDGET Ortiz-CNP  Authorized by: Caren Musa MD    Consent:     Consent obtained:  Verbal and written    Consent given by:  Patient    Procedure risks and benefits discussed: yes      Patient questions answered: yes      Patient agrees, verbalizes understanding, and wants to proceed: yes      Educational handouts given: yes      Instructions and paperwork completed: yes    Procedure:     Pelvic exam performed: yes      Speculum placed in vagina: yes      Tenaculum applied to cervix: no      Type of insemination: intrauterine insemination      Ultrasound guidance: No      Speculum type: Rufina      Catheter type: curved      Curvature: mild      Difficulty: easy      Estimated Blood Loss:  None    Specimen Return: none    Post-procedure:     Patient tolerated procedure well: yes      Post-procedure plan: patient counseled on signs and symptoms for which to call and/or return to clinic    Comments:     Procedure comments:  IUI Procedure note:    The patient presented today for IUI.     Consent signed by patient, IUI sample identified by patient, and Final Verification performed with patient via electronic system \"\" prior to insemination.   All patient's questions were discussed and answered.          Chaperone offered to patient for intimate exam: Patient declined chaperone  Name of chaperone: N/A    Specimen Source: Partner  Specimen Condition: Fresh  TMS 21.3 mil    Additional notes:    Patient was advised to call office if she develops fever, chills, pelvic pain, or heavy bleeding.    Progesterone and post-IUI teaching completed. Will start Progesterone three days after IUI and continue twice daily. Pt will do a home pregnancy test two weeks from IUI date. If home pregnancy test positive, patient will continue Progesterone and [facility] If " home pregnancy test negative, patient will discontinue Progesterone, and was advised to call office with start of menses; will proceed with another cycle if appropriate.  Patient verbalized understanding of plan.      Paloma Mckeon 04/15/24 9:48 AM

## 2024-04-16 ENCOUNTER — SPECIALTY PHARMACY (OUTPATIENT)
Dept: PHARMACY | Facility: CLINIC | Age: 39
End: 2024-04-16

## 2024-04-16 PROCEDURE — RXMED WILLOW AMBULATORY MEDICATION CHARGE

## 2024-04-17 LAB — MIS SERPL-MCNC: 1.64 NG/ML

## 2024-04-26 ENCOUNTER — PHARMACY VISIT (OUTPATIENT)
Dept: PHARMACY | Facility: CLINIC | Age: 39
End: 2024-04-26
Payer: COMMERCIAL

## 2024-05-23 ENCOUNTER — SPECIALTY PHARMACY (OUTPATIENT)
Dept: PHARMACY | Facility: CLINIC | Age: 39
End: 2024-05-23

## 2024-05-23 PROCEDURE — RXMED WILLOW AMBULATORY MEDICATION CHARGE

## 2024-05-24 ENCOUNTER — SPECIALTY PHARMACY (OUTPATIENT)
Dept: PHARMACY | Facility: CLINIC | Age: 39
End: 2024-05-24

## 2024-05-28 ENCOUNTER — PHARMACY VISIT (OUTPATIENT)
Dept: PHARMACY | Facility: CLINIC | Age: 39
End: 2024-05-28
Payer: COMMERCIAL

## 2024-06-13 ENCOUNTER — SPECIALTY PHARMACY (OUTPATIENT)
Dept: PHARMACY | Facility: CLINIC | Age: 39
End: 2024-06-13

## 2024-06-17 ENCOUNTER — SPECIALTY PHARMACY (OUTPATIENT)
Dept: PHARMACY | Facility: CLINIC | Age: 39
End: 2024-06-17

## 2024-06-17 ENCOUNTER — PHARMACY VISIT (OUTPATIENT)
Dept: PHARMACY | Facility: CLINIC | Age: 39
End: 2024-06-17
Payer: COMMERCIAL

## 2024-06-17 PROCEDURE — RXMED WILLOW AMBULATORY MEDICATION CHARGE

## 2024-06-18 ENCOUNTER — CONSULT (OUTPATIENT)
Dept: ENDOCRINOLOGY | Facility: CLINIC | Age: 39
End: 2024-06-18
Payer: COMMERCIAL

## 2024-06-18 VITALS
WEIGHT: 131 LBS | HEART RATE: 80 BPM | TEMPERATURE: 97.8 F | HEIGHT: 64 IN | DIASTOLIC BLOOD PRESSURE: 76 MMHG | BODY MASS INDEX: 22.36 KG/M2 | SYSTOLIC BLOOD PRESSURE: 109 MMHG

## 2024-06-18 DIAGNOSIS — N97.9 FEMALE INFERTILITY: ICD-10-CM

## 2024-06-18 DIAGNOSIS — Z13.29 SCREENING FOR THYROID DISORDER: Primary | ICD-10-CM

## 2024-06-18 DIAGNOSIS — Z11.3 SCREENING FOR STDS (SEXUALLY TRANSMITTED DISEASES): ICD-10-CM

## 2024-06-18 DIAGNOSIS — Z01.83 ENCOUNTER FOR RH BLOOD TYPING: ICD-10-CM

## 2024-06-18 PROCEDURE — 99215 OFFICE O/P EST HI 40 MIN: CPT | Mod: GC | Performed by: OBSTETRICS & GYNECOLOGY

## 2024-06-18 PROCEDURE — 99215 OFFICE O/P EST HI 40 MIN: CPT | Performed by: OBSTETRICS & GYNECOLOGY

## 2024-06-18 ASSESSMENT — COLUMBIA-SUICIDE SEVERITY RATING SCALE - C-SSRS
2. HAVE YOU ACTUALLY HAD ANY THOUGHTS OF KILLING YOURSELF?: NO
1. IN THE PAST MONTH, HAVE YOU WISHED YOU WERE DEAD OR WISHED YOU COULD GO TO SLEEP AND NOT WAKE UP?: NO
6. HAVE YOU EVER DONE ANYTHING, STARTED TO DO ANYTHING, OR PREPARED TO DO ANYTHING TO END YOUR LIFE?: NO

## 2024-06-18 ASSESSMENT — PAIN SCALES - GENERAL: PAINLEVEL: 0-NO PAIN

## 2024-06-18 NOTE — PROGRESS NOTES
[place] IVF/INJECTABLE TIC/IUI    Age: 39 y.o.    Provider: Paz Saini MD  IVF Fellow  Primary RN: Ledy   Reasons for Treatment: {MAINE [id number]:15108}  Last BMI  24 : 22.49 kg/m²       Past Medical History:   Diagnosis Date    Female infertility, unspecified 2020    Female infertility, unexplained    Lyme disease, unspecified 2017    Lyme disease, acute    Other ovarian cyst, right side 09/15/2020    Complex cyst of both ovaries    Other specified joint disorders, left hip 03/15/2016    Femoral acetabular impingement, left    Personal history of other diseases of the female genital tract 2020    History of irregular menstrual cycles    Personal history of other diseases of the female genital tract 2015    Personal history of endometriosis    Personal history of other diseases of the female genital tract 2020    History of dysmenorrhea    Personal history of other diseases of the nervous system and sense organs     History of migraine    Personal history of other specified conditions 2017    History of nausea    Personal history of other specified conditions 10/10/2019    History of syncope    Personal history of other specified conditions 10/07/2019    History of syncope    Personal history of traumatic brain injury     History of concussion    Postural orthostatic tachycardia syndrome (POTS)     POTS (postural orthostatic tachycardia syndrome)       Date Done Consultation Results/Comments    Medication Protocol Lead in: {MAINE LEAD IN:65429}  Fertility Plan Update: ***  Trigger plan: {MAINE TRIGGER PLAN:95564}  Pre-retrieval meds: Antibiotics per protocol  Adjuncts: {MAINE ADJUNCTS:69635}  Notes: ***   24 IVF Consult  [x]    PGT-A/M? {MAINE PGT-A/M:88431}    IVF Information and Authorization (to be completed annually) Received and in chart: {MAINE Yes (Name):42968}     Waiver (Out) Form Received and in chart: {MAINE Yes (Name):37961}    [employer]  "Packet []     Procedure Order Placed []       N/A MFM Consult Okay to proceed? N/A   N/A Psych Consult Okay to proceed? N/A   N/A Genetics Consult Okay to proceed? N/A    Other    Date Done Female Labs Results/Comments   6/23/2023 T&S (Q 1 Year) ABO: A  Rh: POS  Antibody:    6/23/2023 Hep B sAg NONREACTIVE   6/23/2023 Hep C AB NONREACTIVE   6/23/2023 HIV NONREACTIVE   6/23/2023 Syphilis NONREACTIVE   6/23/2023 GC/CT GC: NEGATIVE  CT: NEGATIVE   6/23/2023 Rubella (Q 5 Years) POSITIVE   6/23/2023 Varicella (Q 5 Years) POSITIVE   6/23/2023 TSH 2.18 (Ref range: 0.44 - 3.98 mIU/L)   9/25/2023 HgbA1C 5.4 (Ref range: %)   4/12/2024 AMH 1.638    Carrier Screening SEMA 4 Carrier screening at [facility] results were reviewed with genetic counselor \"Called Ms. Falcon and shared the results of her and her 's expanded carrier screening:     Sujata's: POSITIVE. Identified as a carrier of one condition:    1. Hbb related hemoglobinopathies (Hb [place])  NEGATIVE for all remaining conditions    Lj's: POSITIVE. Identified as a carrier of one condition:     1. GJB2-related nonsyndrominc hearing loss  NEGATIVE for all remaining conditions \"     Uterine Cavity Eval Pelvic US: (06/23/2023) Anteverted uterus with an endometrial lining that measures as below.   The ovaries are normal in  appearance bilaterally.  HSG:   SIS:   Hyster:     Trial Transfer    1/13/2020 Pap Smear Lab Results   Component Value Date     CVTFINALDX   01/13/2020       A.  THINPREP CERVICAL:              Specimen adequacy:       SATISFACTORY FOR EVALUATION.       Quality Indicator: Endocervical/transformation zone component is present.       Quality Indicator: Partially obscuring inflammation.              General Categorization:       NEGATIVE FOR INTRAEPITHELIAL LESION OR MALIGNANCY.                            HIGH RISK HPV TEST RESULT:                       HPV GENOTYPE  16                      NEGATIVE       HPV GENOTYPE  18                      " "NEGATIVE       HPV GENOTYPE  OTHER              NEGATIVE              Reference Range: Negative      2/13/2024 Mammogram ( > 40) FINDINGS:  2D and tomosynthesis images were reviewed at 1 mm slice thickness.      Density:  The breast tissue is extremely dense, which may limit the  sensitivity of mammography.      No suspicious masses or calcifications are identified.      IMPRESSION:  No mammographic evidence of malignancy.      BI-RADS CATEGORY:      BI-RADS Category:  1 Negative.  Recommendation:  Annual Screening.  Recommended Date:  1 Year.  Laterality:  Bilateral.   Date Done Male Labs   Results/Comments  KRISTY DE LOS SANTOS (MRN: [id number])   8/8/2023 Hep B sAg NONREACTIVE   8/8/2023 Hep C AB  NONREACTIVE   8/8/2023 HIV NONREACTIVE   8/8/2023 Syphilis NONREACTIVE   8/8/2023 GC/CT GC: NEGATIVE  CT: NEGATIVE    Carrier Screening SEMA 4 Carrier screening at [facility] results were reviewed with genetic counselor \"Called Ms. Falcon and shared the results of her and her 's expanded carrier screening:     Sujata's: POSITIVE. Identified as a carrier of one condition:    1. Hbb related hemoglobinopathies (Hb [facility])  NEGATIVE for all remaining conditions    Lj's: POSITIVE. Identified as a carrier of one condition:     1. GJB2-related nonsyndrominc hearing loss  NEGATIVE for all remaining conditions \"    4/15/2024 Semen Analysis  Volume(mL): 2.2  Concentration(million/mL): 105.53  Motility(%): 57  Motile Count(million): 3.359    Sperm Freeze  # of vials: ***  TMS post thaw: ***   Date Done Miscellaneous Results/Comments   N/A BMI Checklist  BMI > 40 or < 18 Added to chart:   No   N/A >= 45 Checklist  Added to chart:   No   **Does not need to be completed prior to placing on IVF calendar**    MD Completion:  PAT needed: {YES/NO/NA:03532}  Ectopic Risk: {YES/NO/NA:32784}  Medically Complex: {YES/NO/NA:80981}  "

## 2024-06-18 NOTE — PROGRESS NOTES
"Visit Type: In Person    IVF Note     Patient is a 39 y.o.  female, here for IVF consult due to Unexplained Infertility  Here today with: Spouse  LMP: No LMP recorded.  Menstrual cycles: Regular  AMH: 1.638 (Ref range: <=6.282 ng/mL)  Status of fallopian tubes: HSG normal in    Saline Ultrasound: none  Hysteroscopy:  ()  Past Infertility Treatments:     G1: Conceived first pregnancy with letrozole 5mg/IUI x 3 cycles    Has since done letrozole 5mg/ IUI x 3 cycles without conception, TMS appropriate    SEMA 4 Carrier screening at [facility] results were reviewed with genetic counselor \"Called Ms. Falcon and shared the results of her and her 's expanded carrier screening:     Sujata's: POSITIVE. Identified as a carrier of one condition:    1. Hbb related hemoglobinopathies (Hb [facility])  NEGATIVE for all remaining conditions    Lj's: POSITIVE. Identified as a carrier of one condition:     1. GJB2-related nonsyndrominc hearing loss  NEGATIVE for all remaining conditions \"     GYN HISTORY   HX of abnormal Mammo: No  Pap smear: NILM, HPV -  - next due in 2025  Mammogram: *Assessment  Overall: 1 - Negative  Left:  -   Right:  -   *Recommendation(s): Annual Screening      PMH: possible endo? Was treated with OCP's- never had surgery  Past Medical History:   Diagnosis Date    Female infertility, unspecified 2020    Female infertility, unexplained    Lyme disease, unspecified 2017    Lyme disease, acute    Other ovarian cyst, right side 09/15/2020    Complex cyst of both ovaries    Other specified joint disorders, left hip 03/15/2016    Femoral acetabular impingement, left    Personal history of other diseases of the female genital tract 2020    History of irregular menstrual cycles    Personal history of other diseases of the female genital tract 2015    Personal history of endometriosis    Personal history of other diseases of the female genital tract 2020    History of " dysmenorrhea    Personal history of other diseases of the nervous system and sense organs     History of migraine    Personal history of other specified conditions 02/20/2017    History of nausea    Personal history of other specified conditions 10/10/2019    History of syncope    Personal history of other specified conditions 10/07/2019    History of syncope    Personal history of traumatic brain injury     History of concussion    Postural orthostatic tachycardia syndrome (POTS)     POTS (postural orthostatic tachycardia syndrome)     History of any clotting: No  History of hospitalizations or surgeries: No  History of easy bleeding/bruising: No    PSH  Past Surgical History:   Procedure Laterality Date    OTHER SURGICAL HISTORY  06/24/2014    Surgery     Genetic screening Hx: previously declined, now possibly interested    Social history  Social History     Tobacco Use    Smoking status: Never     Passive exposure: Never    Smokeless tobacco: Never   Substance Use Topics    Drug use: Never     Current smoker: No    Family history  Cognitive deficits: No  Birth defects: No  Other:     Current Meds  Current Outpatient Medications   Medication Sig Dispense Refill    choriogonadotropin christi (Ovidrel) 250 mcg/0.5 mL injection INJECT 250 MCG (1 SYRINGE) UNDER THE SKIN AS A ONE TIME DOSE, AS DIRECTED PER PROVIDER FOR TRIGGER. 1 mL 2    chorionic gonadotropin (Pregnyl) 10,000 unit injection RECONSTITUTE ACCORDING TO INSTRUCTIONS AND INJECT 10,000 UNITS (1 ML) UNDER THE SKIN AS A ONE TIME DOSE, AS DIRECTED PER PROVIDER FOR TRIGGER. 1 each 1    chorionic gonadotropin (Pregnyl) 10,000 unit injection Reconstitute according to instructions and inject 10,000 units (1 mL) under the skin as a one time dose, as directed per provider for trigger. 1 each 0    letrozole (Femara) 2.5 mg tablet Take by mouth once daily.      letrozole (Femara) 2.5 mg tablet Take 2 tablets (5 mg total) by mouth once daily.  Take 2 tablets by mouth  cycle days 3-7 after negative urine pregnancy test every cycle before starting letrozole. 10 tablet 0    prenatal vit 49-iron fum-folic (Mini Prenatal) 6.75 mg iron- 200 mcg tablet Take by mouth.      progesterone (Prometrium) 100 mg capsule Take by mouth.      progesterone (Prometrium) 100 mg capsule Insert 1 capsule (100 mg) into the vagina 2 times a day. Starting 3 days after IUI 60 capsule 3    sodium chloride 1,000 mg tablet Take 1 tablet (1 g) by mouth 3 times a day.      tiZANidine (Zanaflex) 2 mg capsule Take by mouth.      ubrogepant (Ubrelvy) 100 mg tablet tablet Take 1 tablet (100 mg) by mouth if needed (migraine). May repeat in 2 hours if not resolved. Maximum: 200mg per 24 hours. 16 tablet 6    valACYclovir (Valtrex) 500 mg tablet Take by mouth.       No current facility-administered medications for this visit.       Allergies  Patient has no known allergies.    Partner History  Name: KRISTY DE LOS SANTOS (MRN: [id number])  SA in past year: Yes,    Volume(mL): 0.5  Count(million): 232 M (pre wash)  Motility(%):  57%  Motile Count(million): 26.81 M  Pertinent history:     VITALS:  There were no vitals taken for this visit.  BMI:   BMI Readings from Last 1 Encounters:   24 22.49 kg/m²       ASSESSMENT   39 y.o.  female with unexplained infertility x 2 years, and the following pertinent medical issues: possible endo?, POTS.  Indication for IVF: Unexplained Infertility and age  Partner SA: Normal    We reviewed IVF and discussed the following:   In-vitro fertilization and embryo transfer  Stimulation protocols   Oocyte retrieval, risks    Cryopreservation   Assessment of fertilization   Embryo development  Statistics  ICSI/Assisted hatching   Embryo transfer and preparation    Risks of OHSS and multiple gestation   Cancelled cycles   Use of birth control   Selective reduction   Number of embryos to transfer   Ectopic pregnancy  and miscarriage  Team based care  Informed consent procedures  Folic acid  supplementation   Genetic carrier screening   PGT  Frozen tissue storage and transport process  Discussed that PAP and mammogram must be updated if appropriate based on age and clinical  history and results received before treatment can begin.    ART Cycle Plan    Patient is currently undecided about proceeding with IVF. She has a severe needle phobia. Patient inquired about gonadotropin/IUI as well which would be a reasonable option for her, but would also require multiple injections. Pt to reach out to RN with decision of next plan.    1. Protocol/Fertility Plan Update:   Lead in: lupron  Stimulation protocol: standard long lupron, 300 follistim/150 menopur versus clomid flare protocol  Trigger plan: HCG  Pre-retrieval meds: Antibiotics per protocol  Adjuncts:  none  Notes:     2. FET:  Protocol: modified letrozole natural FET (severe needle phobia)  Adjuncts:  ASA 81 mg   Notes:     3. Insemination:  Sperm source: partner  Sperm collection method: Fresh with Frozen Backup  Notes:  ICSI: Yes  # of oocytes to be fertilized: all    4. Transfer:   Number of embryos to replace: 1 euploid or up to 3 untested embryosa  Stage of embryo transfer: day 5  Trial transfer needed? No    5. Cryopreservation plan  PGT: PGTA   Freeze all? Yes  Oocyte cryopreservation: No    6. Patient willing to accept blood transfusion: Yes    7. RN to review chart, initiate IVF boarding pass, and assure completion of the following prior to proceeding with IVF stimulation:       No orders of the defined types were placed in this encounter.      STDs (Hepatitis B, Hepatitis C, HIV, Syphilis, GC/CT) for patient and partner (if applicable) to be completed within the last year (z11.3) needs to be updated- ordered  Genetic carrier testing: waiver or carrier screen completed with clearance documentation by provider for both patient and partner (z13.71) previously completed SEMA 4 and s/p genetics consult at [facility]  Rubella and varicella to be completed  within the last five years (z11.59) completed  TSH to be completed within the last year (z13.29) needs to be updated- ordered  Type & Screen to be completed within the last year (z01.83) needs to be updated- ordered  AMH to be completed within the last year (z31.41) completed  Pre-IVF Imaging: Reference any orders placed by provider.  Cavity evaluation: hysteroscopy with ativan RX  Frozen sperm sample: ensure frozen partner sample (z31.41) or verify donor sperm on site prior to stimulation start date.  Verify in EMR or obtain copy of patient’s last mammogram (if applicable) and pap smear results for provider review in boarding pass. completed  Enroll in Engaged MD and complete annual consent forms for IVF and cryotransport agreements.  BMI checklist for BMI <18 or >40  Consults: Nursing and Financial Consult.  PAT Consult: No  Ectopic Risk: Yes and No  Medically Complex: No  Additional consults  none  and review what is in the boarding pass.    Marce Graham MD PGY 5 06/18/24 9:24 AM

## 2024-07-16 PROCEDURE — RXMED WILLOW AMBULATORY MEDICATION CHARGE

## 2024-07-18 ENCOUNTER — SPECIALTY PHARMACY (OUTPATIENT)
Dept: PHARMACY | Facility: CLINIC | Age: 39
End: 2024-07-18

## 2024-07-19 ENCOUNTER — PHARMACY VISIT (OUTPATIENT)
Dept: PHARMACY | Facility: CLINIC | Age: 39
End: 2024-07-19
Payer: COMMERCIAL

## 2024-08-13 ENCOUNTER — SPECIALTY PHARMACY (OUTPATIENT)
Dept: PHARMACY | Facility: CLINIC | Age: 39
End: 2024-08-13

## 2024-09-10 ENCOUNTER — SPECIALTY PHARMACY (OUTPATIENT)
Dept: PHARMACY | Facility: CLINIC | Age: 39
End: 2024-09-10

## 2024-09-10 PROCEDURE — RXMED WILLOW AMBULATORY MEDICATION CHARGE

## 2024-09-12 ENCOUNTER — PHARMACY VISIT (OUTPATIENT)
Dept: PHARMACY | Facility: CLINIC | Age: 39
End: 2024-09-12
Payer: COMMERCIAL

## 2024-10-09 ENCOUNTER — SPECIALTY PHARMACY (OUTPATIENT)
Dept: PHARMACY | Facility: CLINIC | Age: 39
End: 2024-10-09

## 2024-10-09 PROCEDURE — RXMED WILLOW AMBULATORY MEDICATION CHARGE

## 2024-10-11 ENCOUNTER — PHARMACY VISIT (OUTPATIENT)
Dept: PHARMACY | Facility: CLINIC | Age: 39
End: 2024-10-11
Payer: COMMERCIAL

## 2024-11-05 ENCOUNTER — SPECIALTY PHARMACY (OUTPATIENT)
Dept: PHARMACY | Facility: CLINIC | Age: 39
End: 2024-11-05

## 2024-11-05 PROCEDURE — RXMED WILLOW AMBULATORY MEDICATION CHARGE

## 2024-11-07 ENCOUNTER — PHARMACY VISIT (OUTPATIENT)
Dept: PHARMACY | Facility: CLINIC | Age: 39
End: 2024-11-07
Payer: COMMERCIAL

## 2024-12-16 ENCOUNTER — TELEPHONE (OUTPATIENT)
Dept: PRIMARY CARE | Facility: CLINIC | Age: 39
End: 2024-12-16

## 2024-12-17 ENCOUNTER — HOSPITAL ENCOUNTER (OUTPATIENT)
Dept: RADIOLOGY | Facility: HOSPITAL | Age: 39
Discharge: HOME | End: 2024-12-17
Payer: COMMERCIAL

## 2024-12-17 ENCOUNTER — OFFICE VISIT (OUTPATIENT)
Dept: ORTHOPEDIC SURGERY | Facility: HOSPITAL | Age: 39
End: 2024-12-17
Payer: COMMERCIAL

## 2024-12-17 VITALS — BODY MASS INDEX: 21.34 KG/M2 | HEIGHT: 64 IN | WEIGHT: 125 LBS

## 2024-12-17 DIAGNOSIS — M54.16 LUMBAR BACK PAIN WITH RADICULOPATHY AFFECTING RIGHT LOWER EXTREMITY: ICD-10-CM

## 2024-12-17 DIAGNOSIS — S76.019A MUSCLE STRAIN OF GLUTEAL REGION, INITIAL ENCOUNTER: ICD-10-CM

## 2024-12-17 DIAGNOSIS — M62.830 LUMBAR PARASPINAL MUSCLE SPASM: ICD-10-CM

## 2024-12-17 DIAGNOSIS — M25.552 LEFT HIP PAIN: ICD-10-CM

## 2024-12-17 DIAGNOSIS — M54.16 LEFT LUMBAR RADICULOPATHY: Primary | ICD-10-CM

## 2024-12-17 DIAGNOSIS — M25.852 FEMOROACETABULAR IMPINGEMENT OF LEFT HIP: ICD-10-CM

## 2024-12-17 DIAGNOSIS — S76.312A HAMSTRING STRAIN, LEFT, INITIAL ENCOUNTER: ICD-10-CM

## 2024-12-17 PROCEDURE — 73502 X-RAY EXAM HIP UNI 2-3 VIEWS: CPT | Mod: LEFT SIDE | Performed by: RADIOLOGY

## 2024-12-17 PROCEDURE — 73502 X-RAY EXAM HIP UNI 2-3 VIEWS: CPT | Mod: LT

## 2024-12-17 PROCEDURE — 72110 X-RAY EXAM L-2 SPINE 4/>VWS: CPT | Performed by: RADIOLOGY

## 2024-12-17 PROCEDURE — 99204 OFFICE O/P NEW MOD 45 MIN: CPT | Performed by: STUDENT IN AN ORGANIZED HEALTH CARE EDUCATION/TRAINING PROGRAM

## 2024-12-17 PROCEDURE — 99214 OFFICE O/P EST MOD 30 MIN: CPT | Performed by: STUDENT IN AN ORGANIZED HEALTH CARE EDUCATION/TRAINING PROGRAM

## 2024-12-17 PROCEDURE — 72110 X-RAY EXAM L-2 SPINE 4/>VWS: CPT

## 2024-12-17 PROCEDURE — 3008F BODY MASS INDEX DOCD: CPT | Performed by: STUDENT IN AN ORGANIZED HEALTH CARE EDUCATION/TRAINING PROGRAM

## 2024-12-17 RX ORDER — BACLOFEN 20 MG/1
20 TABLET ORAL 3 TIMES DAILY PRN
Qty: 42 TABLET | Refills: 0 | Status: SHIPPED | OUTPATIENT
Start: 2024-12-17 | End: 2024-12-31

## 2024-12-17 ASSESSMENT — PAIN SCALES - GENERAL: PAINLEVEL_OUTOF10: 7

## 2024-12-17 ASSESSMENT — PAIN - FUNCTIONAL ASSESSMENT: PAIN_FUNCTIONAL_ASSESSMENT: 0-10

## 2024-12-17 NOTE — PROGRESS NOTES
Sports Medicine Injury Clinic Note    Today's Date:  12/17/2024     HPI: Sujata Falcon is a 39 y.o. female who presents today in orthopedic injury clinic for evaluation of left low back and lower extremity pain.  She states she was doing Humberto yesterday and jumped onto her left leg and landed into a lunge, with subsequent pain in left low back radiating into left lower extremity, primarily into posterior lateral aspect of proximal left lower extremity.  Denies any numbness, tingling, weakness, swelling, redness, warmth, or bruising.  Denies any previous injury or surgeries to low back or left lower extremity.  Denies any bowel/bladder incontinence or saddle paresthesias.  States she tried Medrol Dosepak and Zanaflex which she had at home from previous prescription which provided some relief.  She states her pain is worse with walking/standing for longer durations, going from seated to standing position.    She has no other complaints.    Physical Examination:     SKIN: No increased erythema, warmth, rashes, or concerning skin lesions  NEURO: Sensation is intact in the bilateral upper and lower extremities. Strength is grossly 5 out of 5 throughout the bilateral upper and lower extremities, unless noted below.  Positive straight leg raise on the left.  Negative seated slump bilaterally.   GAIT: Antalgic, favoring RLE.  Can heel and toe walk without difficulty. No myelopathic features.   SPINE: Lumbar spine range of motion is slightly limited in flexion/extension and painful going from standing flexion to extension. Tenderness to palpation at the level of left lumbar paraspinal musculature, left gluteal musculature. No tenderness to palpation over the midline or spinous processes. Facet loading maneuvers are negative.  MUSCULOSKELETAL: Bilateral hip range of motion is full and painful over left posterior lateral aspect of hip.  Left posterior lateral and mild left groin pain elicited with PANCHO/FADIR.  Balaji  is negative on right, positive on the left.     Imaging:  Radiographs of the lumbar spine and bilateral hips obtained today were reviewed and revealed no acute osseous abnormalities or significant degenerative changes of lumbar spine with left hip cam morphology/acetabular dysplasia without evidence of acute osseous abnormalities of the hip.    The studies were reviewed by me personally in the office today.    Problem List Items Addressed This Visit    None  Visit Diagnoses         Codes    Lumbar paraspinal muscle spasm    -  Primary M62.830    Relevant Orders    Referral to Medical Spine    Referral to Physical Therapy    Lumbar back pain with radiculopathy affecting right lower extremity     M54.16    Relevant Orders    XR lumbar spine complete 4+ views (Completed)    Referral to Medical Spine    Referral to Physical Therapy    Left hip pain     M25.552    Relevant Orders    XR hip left with pelvis when performed 2 or 3 views (Completed)    Referral to Physical Therapy    Left lumbar radiculopathy     M54.16    Relevant Orders    Referral to Medical Spine    Referral to Physical Therapy    Hamstring strain, left, initial encounter     S76.312A    Relevant Orders    Referral to Medical Spine    Referral to Physical Therapy    Muscle strain of gluteal region, initial encounter     S76.019A    Relevant Orders    Referral to Medical Spine    Referral to Physical Therapy    Femoroacetabular impingement of left hip     M25.852          Assessment and Plan:    We reviewed the exam and x-ray findings and discussed the conservative and surgical treatment options. We agreed to start with conservative management of left low back and lower extremity pain with lumbar paraspinal muscle strain/spasm, hamstring and gluteal muscle strain with evidence of left hip SIMEON.  Physical therapy referral provided and discussed the importance of regular home exercises.  She may continue home Medrol Dosepak as prescribed if providing relief  and instructed patient to avoid NSAIDs when taking this medication.  When Medrol Dosepak completed, she may switch to taking OTC anti-inflammatory such as ibuprofen up to 600 mg 3 times daily with food as needed for acute flares of pain.  Short course of baclofen prescribed for muscle spasm/strain and instructed patient to stop tizanidine while taking this medication.  She may also try OTC lidocaine patch once daily as needed over left side of low back.  Otherwise, recommended use of heat or ice as needed for acute flares of pain and discussed activity modification/relative rest.  Orthopedic spine referral provided to discuss evaluation and management of possible left lumbar radiculopathy.  Plan for follow-up in clinic in 1 month for reevaluation, otherwise she may follow-up as needed if any new concerns arise.  Discussed this plan with patient who is understanding and agreeable.    **This note was dictated using Dragon speech recognition software and was not corrected for spelling or grammatical errors**.    Fabian Santos DO  Primary Care Sports Medicine  [facility] [place]

## 2024-12-18 ENCOUNTER — APPOINTMENT (OUTPATIENT)
Dept: PRIMARY CARE | Facility: CLINIC | Age: 39
End: 2024-12-18
Payer: COMMERCIAL

## 2025-01-02 ENCOUNTER — APPOINTMENT (OUTPATIENT)
Dept: PRIMARY CARE | Facility: CLINIC | Age: 40
End: 2025-01-02
Payer: COMMERCIAL

## 2025-01-07 ENCOUNTER — OFFICE VISIT (OUTPATIENT)
Dept: ORTHOPEDIC SURGERY | Facility: CLINIC | Age: 40
End: 2025-01-07
Payer: COMMERCIAL

## 2025-01-07 VITALS — BODY MASS INDEX: 21.34 KG/M2 | WEIGHT: 125 LBS | HEIGHT: 64 IN

## 2025-01-07 DIAGNOSIS — S76.312A HAMSTRING STRAIN, LEFT, INITIAL ENCOUNTER: ICD-10-CM

## 2025-01-07 DIAGNOSIS — M62.830 LUMBAR PARASPINAL MUSCLE SPASM: ICD-10-CM

## 2025-01-07 DIAGNOSIS — M54.16 LEFT LUMBAR RADICULOPATHY: ICD-10-CM

## 2025-01-07 DIAGNOSIS — M54.16 LUMBAR BACK PAIN WITH RADICULOPATHY AFFECTING RIGHT LOWER EXTREMITY: ICD-10-CM

## 2025-01-07 DIAGNOSIS — S76.019A MUSCLE STRAIN OF GLUTEAL REGION, INITIAL ENCOUNTER: ICD-10-CM

## 2025-01-07 PROCEDURE — 99203 OFFICE O/P NEW LOW 30 MIN: CPT

## 2025-01-07 PROCEDURE — 3008F BODY MASS INDEX DOCD: CPT

## 2025-01-07 PROCEDURE — 99213 OFFICE O/P EST LOW 20 MIN: CPT

## 2025-01-07 ASSESSMENT — PAIN DESCRIPTION - DESCRIPTORS: DESCRIPTORS: SHOOTING

## 2025-01-07 ASSESSMENT — PAIN - FUNCTIONAL ASSESSMENT: PAIN_FUNCTIONAL_ASSESSMENT: 0-10

## 2025-01-07 NOTE — PROGRESS NOTES
HPI:  Sujata Falcon is a 39-year-old female who presents today with a 2.5-week history of left-sided lumbar radiculopathy after taking a bad step during a Humberto class.  She states her symptoms went from her glutes, lateral leg, into the shin.  She has been doing physical therapy and took a Medrol Dosepak which have been helping her symptoms.  She denies right leg pain.  No balance or coordination deficits.  No numbness and tingling in the lower extremities.    Of note, she had a random history of dropfoot on the right side 15 years ago which resolved about 1 month.  She also had similar left leg symptoms with childbirth about 3 years ago.  Her father has a history of spinal stenosis.    ROS:  Reviewed on EMR and patient intake sheet.    PMH/SH:  Reviewed on EMR and patient intake sheet.    Exam:  MSK: Full strength range of motion of lower extremities bilaterally.  Negative straight raise bilaterally.  General: No acute distress. Awake and conversant.  Eyes: Normal conjunctiva, anicteric. Round symmetric pupils.  ENT: Hearing grossly intact. No nasal discharge.  Neck: Neck is supple. No masses or thyromegaly.  Respiratory: Respirations are non-labored. No wheezing.  Skin: Warm. No rashes or ulcers.  Psych: Alert and oriented. Cooperative, appropriate mood and affect, normal judgement.  CV: No lower extremity edema.  Neuro: Sensation and CN II-XII grossly normal.    Radiology:     X-rays of lumbar spine personally reviewed and demonstrate no acute fractures or dislocations.  Disc height well-maintained.  Alignment within normal limits.    Diagnosis:    Lumbar radiculopathy    Assessment and Plan:  Patient was seen today in evaluate for a 2.5-week history of lumbar radicular symptoms.  Her symptoms have been improving with physical therapy and steroids.  At this time, I recommended continuing these, ambulating as tolerated, maintaining a healthy weight, and use of over-the-counter pain medications when needed.  She  was recommended to follow-up if her symptoms worsen or return.  Patient feels her questions were answered today.  Patient agrees to the plan above.    **This note was dictated using speech recognition software and was not corrected for spelling or grammatical errors**    Yovanny Hutchison PA-C  Department of Orthopaedic Surgery  9:38 AM  01/07/25    [id number] [place] [address]    Voicemail: [phone]   Appts: [phone]  Fax: [phone]

## 2025-01-13 ENCOUNTER — APPOINTMENT (OUTPATIENT)
Dept: PHYSICAL THERAPY | Facility: CLINIC | Age: 40
End: 2025-01-13
Payer: COMMERCIAL

## 2025-01-14 ENCOUNTER — APPOINTMENT (OUTPATIENT)
Dept: ORTHOPEDIC SURGERY | Facility: HOSPITAL | Age: 40
End: 2025-01-14
Payer: COMMERCIAL

## 2025-01-30 ENCOUNTER — SPECIALTY PHARMACY (OUTPATIENT)
Dept: PHARMACY | Facility: CLINIC | Age: 40
End: 2025-01-30

## 2025-01-30 PROCEDURE — RXMED WILLOW AMBULATORY MEDICATION CHARGE

## 2025-01-31 ENCOUNTER — PHARMACY VISIT (OUTPATIENT)
Dept: PHARMACY | Facility: CLINIC | Age: 40
End: 2025-01-31
Payer: COMMERCIAL

## 2025-02-25 DIAGNOSIS — G43.909 MIGRAINE WITHOUT STATUS MIGRAINOSUS, NOT INTRACTABLE, UNSPECIFIED MIGRAINE TYPE: ICD-10-CM

## 2025-02-26 ENCOUNTER — SPECIALTY PHARMACY (OUTPATIENT)
Dept: PHARMACY | Facility: CLINIC | Age: 40
End: 2025-02-26

## 2025-03-06 PROCEDURE — RXMED WILLOW AMBULATORY MEDICATION CHARGE

## 2025-03-07 ENCOUNTER — PHARMACY VISIT (OUTPATIENT)
Dept: PHARMACY | Facility: CLINIC | Age: 40
End: 2025-03-07
Payer: COMMERCIAL

## 2025-04-03 ENCOUNTER — SPECIALTY PHARMACY (OUTPATIENT)
Dept: PHARMACY | Facility: CLINIC | Age: 40
End: 2025-04-03

## 2025-04-03 PROCEDURE — RXMED WILLOW AMBULATORY MEDICATION CHARGE

## 2025-04-04 ENCOUNTER — PHARMACY VISIT (OUTPATIENT)
Dept: PHARMACY | Facility: CLINIC | Age: 40
End: 2025-04-04
Payer: COMMERCIAL

## 2025-05-02 DIAGNOSIS — G43.909 MIGRAINE WITHOUT STATUS MIGRAINOSUS, NOT INTRACTABLE, UNSPECIFIED MIGRAINE TYPE: ICD-10-CM

## 2025-05-09 ENCOUNTER — SPECIALTY PHARMACY (OUTPATIENT)
Dept: PHARMACY | Facility: CLINIC | Age: 40
End: 2025-05-09

## 2025-05-13 ENCOUNTER — APPOINTMENT (OUTPATIENT)
Dept: NEUROLOGY | Facility: CLINIC | Age: 40
End: 2025-05-13
Payer: COMMERCIAL

## 2025-05-13 DIAGNOSIS — G43.909 MIGRAINE WITHOUT STATUS MIGRAINOSUS, NOT INTRACTABLE, UNSPECIFIED MIGRAINE TYPE: Primary | ICD-10-CM

## 2025-05-13 PROCEDURE — 99213 OFFICE O/P EST LOW 20 MIN: CPT

## 2025-05-13 NOTE — PROGRESS NOTES
"Chief Complaint   Patient presents with    Migraine     Yearly follow up visit     Virtual or Telephone Consent  An interactive audio and video telecommunication system which permits real time communications between the patient (at the originating site) and provider (at the distant site) was utilized to provide this telehealth service.     Verbal consent was requested and obtained from Sujata Falcon on this date, 05/13/25 for a telehealth visit and the patient's location was confirmed at the time of the visit.    Subjective   HPI  Sujata Falcon is a 40 y.o. year old female who presents with chief complaint Migraine without status migrainosus, not intractable, unspecified migraine type [G43.909].    Migraine triggers are menstrual cycle, certain foods, stress. Now reports that she is also waking with headaches.   Migraine occurs one side of head-either, above eye or occipital. A lot of pain where\"skull sits on my neck\"  Associated light and noise sensitivity, nausea.  Migraine rescue-Ubrelvy at the onset of migraine.  Treats with Ubrelvy 50mg. 50% of time resolves. 50% of time repeats. Resolves 80% of time in 4-6 hours.     The patient denies the presence of any associated double vision, speech problems, confusion, facial or extremity weakness or numbness or problems with coordination. Denies other neurological history of seizures, stroke, or TIA.    Current/ past HA treatments:  Preventive:  Nortriptyline  Baclofen  Amitriptyline- AM sluggishness-could not stay awake  Topamax -side effect    Rescue:  Ubrelvy  Double medrol Dosepak needed 1/2024 to break migraine cycle.   Sumatriptan side effect throat closing  Almotriptan  Relpax  Rizatriptan  Zolmitriptan    Current Medications[1]    RX Allergies[2]    ROS  As noted in HPI, otherwise all other systems have been reviewed are negative for complaint.     Objective   General Appearance: Sujata is well-developed, well-nourished, 40 y.o. year old female, in no acute " distress. Makes good eye contact, is alert, interactive, and cooperative. Demonstrates recent & remote memory recall. Subjective information consistent with objective assessment. *Assessment limited due to virtual visit.    Neurological Exam  Cranial Nerves  CN III, IV, VI: Extraocular movements intact bilaterally. Normal lids and orbits bilaterally.  CN VII: Full and symmetric facial movement.  CN VIII: Hearing is normal.    RECORDS REVIEW:  Previous records (provider notes, laboratory reports, and imaging studies were reviewed and summarized).  Assessment & Plan  Migraine without status migrainosus, not intractable, unspecified migraine type    Orders:    rimegepant (Nurtec ODT) 75 mg tablet,disintegrating; Dissolve 1 tablet (75 mg) in the mouth once daily as needed (for acute migraine, no more than 1 dose in 24 hours).    ubrogepant (Ubrelvy) 100 mg tablet; Take 1 tablet (100 mg) by mouth if needed (migraine). May repeat in 2 hours if not resolved. Maximum: 200mg per 24 hours.    Follow Up In Neurology; Future         ASSESSMENT/PLAN:  Re-start magnesium 400 mg to 800 mg daily (hasn't taken since pregnancy).  Will send Ubrelvy for refill ( Specialty).  Nurtec as needed (every other day prior to menses, if needed).   Follow up in 6 months.    I personally spent 23 minutes today, exclusive of procedures, providing care for this patient, including preparation, face to face time, documentation and other services such as review of medical records, diagnostic result, patient education, counseling, coordination of care as specified in the encounter.     Danielle Bojorquez, APRN-CNP         [1]   Current Outpatient Medications:     baclofen (Lioresal) 20 mg tablet, Take 1 tablet (20 mg) by mouth 3 times a day as needed for muscle spasms for up to 14 days., Disp: 42 tablet, Rfl: 0    chorionic gonadotropin (Pregnyl) 10,000 unit injection, Reconstitute according to instructions and inject 10,000 units (1 mL) under the  skin as a one time dose, as directed per provider for trigger., Disp: 1 each, Rfl: 0    letrozole (Femara) 2.5 mg tablet, Take by mouth once daily., Disp: , Rfl:     prenatal vit 49-iron fum-folic (Mini Prenatal) 6.75 mg iron- 200 mcg tablet, Take by mouth., Disp: , Rfl:     progesterone (Prometrium) 100 mg capsule, Take by mouth., Disp: , Rfl:     sodium chloride 1,000 mg tablet, Take 1 tablet (1 g) by mouth 3 times a day., Disp: , Rfl:     tiZANidine (Zanaflex) 2 mg capsule, Take by mouth., Disp: , Rfl:     ubrogepant (Ubrelvy) 100 mg tablet, Take 1 tablet (100 mg) by mouth if needed (migraine). May repeat in 2 hours if not resolved. Maximum: 200mg per 24 hours., Disp: 16 tablet, Rfl: 1    valACYclovir (Valtrex) 500 mg tablet, Take by mouth., Disp: , Rfl:   [2] No Known Allergies

## 2025-05-19 PROCEDURE — RXMED WILLOW AMBULATORY MEDICATION CHARGE

## 2025-05-22 ENCOUNTER — PHARMACY VISIT (OUTPATIENT)
Dept: PHARMACY | Facility: CLINIC | Age: 40
End: 2025-05-22
Payer: COMMERCIAL

## 2025-05-27 ENCOUNTER — SPECIALTY PHARMACY (OUTPATIENT)
Dept: PHARMACY | Facility: CLINIC | Age: 40
End: 2025-05-27

## 2025-05-28 PROCEDURE — RXMED WILLOW AMBULATORY MEDICATION CHARGE

## 2025-06-09 ENCOUNTER — SPECIALTY PHARMACY (OUTPATIENT)
Dept: PHARMACY | Facility: CLINIC | Age: 40
End: 2025-06-09

## 2025-06-10 ENCOUNTER — PHARMACY VISIT (OUTPATIENT)
Dept: PHARMACY | Facility: CLINIC | Age: 40
End: 2025-06-10
Payer: COMMERCIAL

## 2025-06-27 ENCOUNTER — SPECIALTY PHARMACY (OUTPATIENT)
Dept: PHARMACY | Facility: CLINIC | Age: 40
End: 2025-06-27

## 2025-07-15 PROCEDURE — RXMED WILLOW AMBULATORY MEDICATION CHARGE

## 2025-07-22 ENCOUNTER — PHARMACY VISIT (OUTPATIENT)
Dept: PHARMACY | Facility: CLINIC | Age: 40
End: 2025-07-22
Payer: COMMERCIAL

## 2025-09-05 ENCOUNTER — TELEPHONE (OUTPATIENT)
Dept: ENDOCRINOLOGY | Facility: CLINIC | Age: 40
End: 2025-09-05
Payer: COMMERCIAL